# Patient Record
Sex: MALE | Race: WHITE | NOT HISPANIC OR LATINO | Employment: FULL TIME | URBAN - METROPOLITAN AREA
[De-identification: names, ages, dates, MRNs, and addresses within clinical notes are randomized per-mention and may not be internally consistent; named-entity substitution may affect disease eponyms.]

---

## 2017-05-12 DIAGNOSIS — Z00.00 ROUTINE GENERAL MEDICAL EXAMINATION AT A HEALTH CARE FACILITY: Primary | ICD-10-CM

## 2017-06-23 ENCOUNTER — CLINICAL SUPPORT (OUTPATIENT)
Dept: INTERNAL MEDICINE | Facility: CLINIC | Age: 37
End: 2017-06-23

## 2017-06-23 ENCOUNTER — CLINICAL SUPPORT (OUTPATIENT)
Dept: CARDIOLOGY | Facility: CLINIC | Age: 37
End: 2017-06-23

## 2017-06-23 ENCOUNTER — OFFICE VISIT (OUTPATIENT)
Dept: INTERNAL MEDICINE | Facility: CLINIC | Age: 37
End: 2017-06-23

## 2017-06-23 ENCOUNTER — CLINICAL SUPPORT (OUTPATIENT)
Dept: REHABILITATION | Facility: HOSPITAL | Age: 37
End: 2017-06-23
Attending: INTERNAL MEDICINE

## 2017-06-23 VITALS
SYSTOLIC BLOOD PRESSURE: 104 MMHG | WEIGHT: 158.75 LBS | TEMPERATURE: 97 F | BODY MASS INDEX: 21.5 KG/M2 | HEART RATE: 64 BPM | BODY MASS INDEX: 21.4 KG/M2 | HEIGHT: 72 IN | HEIGHT: 72 IN | OXYGEN SATURATION: 99 % | RESPIRATION RATE: 14 BRPM | DIASTOLIC BLOOD PRESSURE: 60 MMHG | SYSTOLIC BLOOD PRESSURE: 104 MMHG | DIASTOLIC BLOOD PRESSURE: 60 MMHG | HEART RATE: 64 BPM | RESPIRATION RATE: 14 BRPM | WEIGHT: 158 LBS

## 2017-06-23 DIAGNOSIS — Z00.00 ROUTINE GENERAL MEDICAL EXAMINATION AT A HEALTH CARE FACILITY: ICD-10-CM

## 2017-06-23 DIAGNOSIS — Z00.00 ROUTINE GENERAL MEDICAL EXAMINATION AT A HEALTH CARE FACILITY: Primary | ICD-10-CM

## 2017-06-23 LAB
ALBUMIN SERPL BCP-MCNC: 4.2 G/DL
ALP SERPL-CCNC: 32 U/L
ALT SERPL W/O P-5'-P-CCNC: 13 U/L
ANION GAP SERPL CALC-SCNC: 12 MMOL/L
AST SERPL-CCNC: 15 U/L
BILIRUB SERPL-MCNC: 1.8 MG/DL
BUN SERPL-MCNC: 13 MG/DL
CALCIUM SERPL-MCNC: 9.5 MG/DL
CHLORIDE SERPL-SCNC: 104 MMOL/L
CHOLEST/HDLC SERPL: 2.4 {RATIO}
CO2 SERPL-SCNC: 24 MMOL/L
CREAT SERPL-MCNC: 1 MG/DL
ERYTHROCYTE [DISTWIDTH] IN BLOOD BY AUTOMATED COUNT: 12.3 %
EST. GFR  (AFRICAN AMERICAN): >60 ML/MIN/1.73 M^2
EST. GFR  (NON AFRICAN AMERICAN): >60 ML/MIN/1.73 M^2
ESTIMATED AVG GLUCOSE: 91 MG/DL
GLUCOSE SERPL-MCNC: 94 MG/DL
HBA1C MFR BLD HPLC: 4.8 %
HCT VFR BLD AUTO: 42.5 %
HDL/CHOLESTEROL RATIO: 41.3 %
HDLC SERPL-MCNC: 172 MG/DL
HDLC SERPL-MCNC: 71 MG/DL
HGB BLD-MCNC: 14.9 G/DL
LDLC SERPL CALC-MCNC: 91.4 MG/DL
MCH RBC QN AUTO: 31.6 PG
MCHC RBC AUTO-ENTMCNC: 35.1 %
MCV RBC AUTO: 90 FL
NONHDLC SERPL-MCNC: 101 MG/DL
PLATELET # BLD AUTO: 172 K/UL
PMV BLD AUTO: 10.2 FL
POTASSIUM SERPL-SCNC: 4.1 MMOL/L
PROT SERPL-MCNC: 7 G/DL
RBC # BLD AUTO: 4.72 M/UL
SODIUM SERPL-SCNC: 140 MMOL/L
TRIGL SERPL-MCNC: 48 MG/DL
WBC # BLD AUTO: 3.4 K/UL

## 2017-06-23 PROCEDURE — 93000 ELECTROCARDIOGRAM COMPLETE: CPT | Mod: ,,, | Performed by: NUCLEAR MEDICINE

## 2017-06-23 PROCEDURE — 99999 PR PBB SHADOW E&M-EST. PATIENT-LVL III: CPT | Mod: PBBFAC,,, | Performed by: INTERNAL MEDICINE

## 2017-06-23 PROCEDURE — 80053 COMPREHEN METABOLIC PANEL: CPT | Mod: PO

## 2017-06-23 PROCEDURE — 83036 HEMOGLOBIN GLYCOSYLATED A1C: CPT

## 2017-06-23 PROCEDURE — 85027 COMPLETE CBC AUTOMATED: CPT | Mod: PO

## 2017-06-23 PROCEDURE — 80061 LIPID PANEL: CPT | Mod: PO

## 2017-06-23 PROCEDURE — 99386 PREV VISIT NEW AGE 40-64: CPT | Mod: ,,, | Performed by: INTERNAL MEDICINE

## 2017-06-23 PROCEDURE — 97750 PHYSICAL PERFORMANCE TEST: CPT | Performed by: PHYSICAL THERAPIST

## 2017-06-23 NOTE — PROGRESS NOTES
Subjective:      Patient ID: Janes Cummings is a 37 y.o. male.    Chief Complaint: Executive Health    It was a pleasure to meet you and perform your  Executive Health Physical for [facility] on 6/23/17.      SOCIAL HISTORY:  Former smoker quit 20 yrs ago, drinks avg one beer daily.  No drug use.  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   with two health children.    Your primary care  physician is Dr. Carrizales.      PAST MEDICAL HISTORY:  History of transfusion with  infant jaundice, lumbar disk disease.       SURGERIES:  Include wisdom tooth extraction and left  arm surgery.     FAMILY HISTORY:  Mother with h/o breast and endometrial  Cancer.  Father with alcohol abuse,  anxiety and depression,  hyperlipidemia.     HEALTH MAINTENANCE:  Tetanus booster was received in sept 2016.       Review of Systems   Constitutional: Negative for chills and fever.   HENT: Negative for ear pain and sore throat.    Respiratory: Negative for cough.    Cardiovascular: Negative for chest pain.   Gastrointestinal: Negative for abdominal pain and blood in stool.   Genitourinary: Negative for dysuria and hematuria.   Neurological: Negative for seizures and syncope.     Objective:   /60 (BP Location: Right arm, Patient Position: Sitting)   Pulse 64   Temp 96.6 °F (35.9 °C) (Tympanic)   Resp 14   Ht 6' (1.829 m)   Wt 71.7 kg (158 lb)   SpO2 99%   BMI 21.43 kg/m²     Physical Exam   Constitutional: He is oriented to person, place, and time. He appears well-developed and well-nourished. No distress.   HENT:   Head: Normocephalic and atraumatic.   Mouth/Throat: Oropharynx is clear and moist.   Eyes: EOM are normal. Pupils are equal, round, and reactive to light.   Neck: Neck supple. No thyromegaly present.   Cardiovascular: Normal rate and regular rhythm.    Pulmonary/Chest: Breath sounds normal. He has no wheezes. He has no rales.   Abdominal: Soft. Bowel sounds are normal. There is no tenderness.    Lymphadenopathy:     He has no cervical adenopathy.   Neurological: He is alert and oriented to person, place, and time.   Skin: Skin is warm and dry.   Psychiatric: He has a normal mood and affect. His behavior is normal.       Clinical Support on 06/23/2017   Component Date Value Ref Range Status    Sodium 06/23/2017 140  136 - 145 mmol/L Final    Potassium 06/23/2017 4.1  3.5 - 5.1 mmol/L Final    Chloride 06/23/2017 104  95 - 110 mmol/L Final    CO2 06/23/2017 24  23 - 29 mmol/L Final    Glucose 06/23/2017 94  70 - 110 mg/dL Final    BUN, Bld 06/23/2017 13  6 - 20 mg/dL Final    Creatinine 06/23/2017 1.0  0.5 - 1.4 mg/dL Final    Calcium 06/23/2017 9.5  8.7 - 10.5 mg/dL Final    Total Protein 06/23/2017 7.0  6.0 - 8.4 g/dL Final    Albumin 06/23/2017 4.2  3.5 - 5.2 g/dL Final    Total Bilirubin 06/23/2017 1.8* 0.1 - 1.0 mg/dL Final    Comment: For infants and newborns, interpretation of results should be based  on gestational age, weight and in agreement with clinical  observations.  Premature Infant recommended reference ranges:  Up to 24 hours.............<8.0 mg/dL  Up to 48 hours............<12.0 mg/dL  3-5 days..................<15.0 mg/dL  6-29 days.................<15.0 mg/dL      Alkaline Phosphatase 06/23/2017 32* 55 - 135 U/L Final    AST 06/23/2017 15  10 - 40 U/L Final    ALT 06/23/2017 13  10 - 44 U/L Final    Anion Gap 06/23/2017 12  8 - 16 mmol/L Final    eGFR if  06/23/2017 >60  >60 mL/min/1.73 m^2 Final    eGFR if non African American 06/23/2017 >60  >60 mL/min/1.73 m^2 Final    Comment: Calculation used to obtain the estimated glomerular filtration  rate (eGFR) is the CKD-EPI equation. Since race is unknown   in our information system, the eGFR values for   -American and Non--American patients are given   for each creatinine result.      WBC 06/23/2017 3.40* 3.90 - 12.70 K/uL Final    RBC 06/23/2017 4.72  4.60 - 6.20 M/uL Final    Hemoglobin  06/23/2017 14.9  14.0 - 18.0 g/dL Final    Hematocrit 06/23/2017 42.5  40.0 - 54.0 % Final    MCV 06/23/2017 90  82 - 98 fL Final    MCH 06/23/2017 31.6* 27.0 - 31.0 pg Final    MCHC 06/23/2017 35.1  32.0 - 36.0 % Final    RDW 06/23/2017 12.3  11.5 - 14.5 % Final    Platelets 06/23/2017 172  150 - 350 K/uL Final    MPV 06/23/2017 10.2  9.2 - 12.9 fL Final    Cholesterol 06/23/2017 172  120 - 199 mg/dL Final    Comment: The National Cholesterol Education Program (NCEP) has set the  following guidelines (reference ranges) for Cholesterol:  Optimal.....................<200 mg/dL  Borderline High.............200-239 mg/dL  High........................> or = 240 mg/dL      Triglycerides 06/23/2017 48  30 - 150 mg/dL Final    Comment: The National Cholesterol Education Program (NCEP) has set the  following guidelines (reference values) for triglycerides:  Normal......................<150 mg/dL  Borderline High.............150-199 mg/dL  High........................200-499 mg/dL      HDL 06/23/2017 71  40 - 75 mg/dL Final    Comment: The National Cholesterol Education Program (NCEP) has set the  following guidelines (reference values) for HDL Cholesterol:  Low...............<40 mg/dL  Optimal...........>60 mg/dL      LDL Cholesterol 06/23/2017 91.4  63.0 - 159.0 mg/dL Final    Comment: The National Cholesterol Education Program (NCEP) has set the  following guidelines (reference values) for LDL Cholesterol:  Optimal.......................<130 mg/dL  Borderline High...............130-159 mg/dL  High..........................160-189 mg/dL  Very High.....................>190 mg/dL      HDL/Chol Ratio 06/23/2017 41.3  20.0 - 50.0 % Final    Total Cholesterol/HDL Ratio 06/23/2017 2.4  2.0 - 5.0 Final    Non-HDL Cholesterol 06/23/2017 101  mg/dL Final    Comment: Risk category and Non-HDL cholesterol goals:  Coronary heart disease (CHD)or equivalent (10-year risk of CHD >20%):  Non-HDL cholesterol goal     <130  mg/dL  Two or more CHD risk factors and 10-year risk of CHD <= 20%:  Non-HDL cholesterol goal     <160 mg/dL  0 to 1 CHD risk factor:  Non-HDL cholesterol goal     <190 mg/dL         Assessment:     1. Routine general medical examination at a health care facility      Plan:   Routine general medical examination at a health care facility      Heart healthy diet and reg exercise  HM reviewed with pt    See exec health letter for details.        No Follow-up on file.

## 2017-06-23 NOTE — PROGRESS NOTES
:  78    DX: v70.0  Orders:  Fitness Evaluation    Patient's 3rd [facility] Fitness Component evaluation was completed.  Results are as follows:    Height (in):    72                            Weight (lbs):    159                                    BMI:   21.6    Resting Energy Expenditure:         1530       kcal/24 hrs.              Estimated:    1683     REE measured post treadmill:          No   REE measured fasting:       Yes          Body Composition:          13.71  % body fat             Rating: Excellent    Waist to Hip Ratio:     0.83                    Risk:  Low   Hip taken over clothing:      Yes          Muscular Strength and Endurance Assessment:               Strength (lbs):     Right: 116.3             Rating:  Average      Left:  63.7           Rating: Below Average  Patient with H/O L elbow and forearm injury at 5-6 years of age with subsequent surgery and grafting   Push-ups:   23                 Rating:  Very Good   Curl-ups :   75                Rating:  Well Above Average    Flexibility Testing:   Sit and Reach (cm):    24.5                       Rating:  Good    The patient tolerated the testing procedures well.